# Patient Record
(demographics unavailable — no encounter records)

---

## 2025-07-25 NOTE — ASSESSMENT
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 He is presenting as a post-op follow-up. He has a new non-pruritic macular rash of bilateral legs unlikely due to the stent-graft. Otherwise no concerning symptoms, and access sites are well-healed.  PLAN - Aortoiliac Duplex reviewed, no evidence of endoleak or sac growth. - Will follow-up in six months with repeat Duplex

## 2025-07-25 NOTE — END OF VISIT
[] : Resident [FreeTextEntry3] : as above new diffuse lower body rash.  no known allergy hx.  I asked about possible nickel allery, pt/fam unaware will refer to allergy for testing 6 mo fu evar surveillance [Time Spent: ___ minutes] : I have spent [unfilled] minutes of time on the encounter which excludes teaching and separately reported services.

## 2025-07-25 NOTE — HISTORY OF PRESENT ILLNESS
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 He is presenting as a post-op follow-up.  Patient was seen with his daughter. His only complaint is new onset of bilateral lower leg macular rash. He first noticed it three days ago. Denies itchiness, pain, fever, shortness of breath, joint pain, recent tick exposure, new skin products.   He is otherwise doing well. He denies calf claudication, buttock claudication, abdominal pain, back pain, or groin pain. He has mild left hallux numbness in the distal digit since surgery that is stable, but denies weakness in bilateral legs and feet.

## 2025-07-25 NOTE — PHYSICAL EXAM
[Normal Rate and Rhythm] : normal rate and rhythm [2+] : right 2+ [0] : left 0 [Ankle Swelling Bilaterally] : bilaterally  [Petechiae] : petechiae [Alert] : alert [Calm] : calm [Ankle Swelling (On Exam)] : not present [] : not present [de-identified] : well-appearing, interactive male in no distress [de-identified] : normal work of breathing on room air, no wheezing [FreeTextEntry1] : Bilateral groin access sites appear well-healed, no ecchymosis or evidence of hematoma, non-tender to palpation [de-identified] : Moves all extremities equally, ambulates without difficulty [de-identified] : bilateral legs with non-tender, cherry-red macular rashes. No chest or back rash